# Patient Record
Sex: FEMALE | Race: WHITE | Employment: OTHER | URBAN - METROPOLITAN AREA
[De-identification: names, ages, dates, MRNs, and addresses within clinical notes are randomized per-mention and may not be internally consistent; named-entity substitution may affect disease eponyms.]

---

## 2017-05-01 ENCOUNTER — HOSPITAL ENCOUNTER (OUTPATIENT)
Dept: PHYSICAL THERAPY | Age: 67
Discharge: HOME | End: 2017-05-01
Payer: MEDICARE

## 2017-05-01 PROCEDURE — 97530 THERAPEUTIC ACTIVITIES: CPT | Performed by: PHYSICAL THERAPIST

## 2017-05-01 PROCEDURE — G8979 MOBILITY GOAL STATUS: HCPCS | Performed by: PHYSICAL THERAPIST

## 2017-05-01 PROCEDURE — G8978 MOBILITY CURRENT STATUS: HCPCS | Performed by: PHYSICAL THERAPIST

## 2017-05-01 PROCEDURE — 97162 PT EVAL MOD COMPLEX 30 MIN: CPT | Performed by: PHYSICAL THERAPIST

## 2017-05-01 NOTE — PROGRESS NOTES
[address] PHYS[facility] THERAPY AT [facility]  [address] [place] [address], Eulalio Carbone  Phone [phone]  Fax [phone] / [address] [address] PHYSICAL THERAPY SERVICES  Patient Name: Alexis Clark : 1950   Medical   Diagnosis: Pain in right foot [M79.671] Treatment Diagnosis: R foot pain   Onset Date: 2016     Referral Source: Markus Gonzales DPM Start of Care [facility]): 2017   Prior Hospitalization: See medical history Provider #: [id number]   Prior Level of Function: Functional IND, IND ambulator   Comorbidities: Fibromyalgia, depression, arthritis, HBP   Medications: Verified on Patient Summary List   The Plan of Care and following information is based on the information from the initial evaluation.   ===========================================================================================  Assessment / key information:  Pt is a 78year old female with subjective complaints of R heel pain that started in 2016 for unknown reasons. She state that she went to see her MD in 2016 where she had an X-ray and was placed in a boot for 6 weeks. X-rays of the R heel was negative. She states that after the 6 weeks she went back to the MD and her pain was better but did not completely go away. She states that she was given heel pads for her shoes which has helped. She had an MRI when she went back to see her MD which showed chronic R [place]'s tendinitis and she was referred to PT. Currently she rates her pain level in the R heel is a 3/10. Aggravating factors include pain at night, prolonged walking, and going up/down steps. Her symptoms are alleviated with heat/ice, rest, and prescription medication. Pt reports that she has a 2 story home and her bedroom is on the second floor. IND with all ALDs/IADLS. Pt is retired. Negative for red flags. FOTO: 47/100. Upon evaluation, pt ambulates with normalized gait pattern.  Noted B forefoot supination with rigid forefoot. Noted calcaneal varus B. SLS B: L: 11 sec, R: 14 sec. SL HR: L 25, R: 2 with increased pain 8/10 in the R AT. AROM of the R ankle: DF: 7 deg PF: 55 deg IN: 35 deg EV: 15 deg, L ankle: DF: 10 deg, PF: 50 deg, IN: 35 deg EV: 10 deg. B hip strength: flexion: 5/5 B abduction: 4/5, extension: 4+/5. B ankle strength is a 5/5 except R ankle PF 3/5. Noted tenderness over the R Onel's tendon. Negative special tests of the R ankle/foot. Pt would benefit from a course of skilled PT to address above deficits to improve overall functional activity tolerance.   ==================================================================================Eval Complexity: History: HIGH Complexity :3+ comorbidities / personal factors will impact the outcome/ POC Exam:HIGH Complexity : 4+ Standardized tests and measures addressing body structure, function, activity limitation and / or participation in recreation  Presentation: MEDIUM Complexity : Evolving with changing characteristics  Clinical Decision Making:MEDIUM Complexity : FOTO score of 26-74Overall Complexity:MEDIUM  Problem List: pain affecting function, decrease ROM, decrease strength, impaired gait/ balance, decrease ADL/ functional abilitiies, decrease activity tolerance, decrease flexibility/ joint mobility and decrease transfer abilities   Treatment Plan may include any combination of the following: Therapeutic exercise, Therapeutic activities, Neuromuscular re-education, Physical agent/modality, Gait/balance training, Manual therapy and Patient education  Patient / Family readiness to learn indicated by: asking questions and trying to perform skills  Persons(s) to be included in education: patient (P)  Barriers to Learning/Limitations: None  Measures taken:    Patient Goal (s): Re-strengthening and rebuild ankle   Patient self reported health status: fair  Rehabilitation Potential: good   Short Term Goals:  To be accomplished in  2 weeks: 1. Pt will be IND and compliant with HEP for self-management symptoms.  Long Term Goals: To be accomplished in  10  treatments:  1. Pt will improve B hip strength to 5/5 to gait stability. 2. Pt will be able to ambulate on unlevel surfaces with no more than 2/10 pain to perform yard-work without restrictions. 3. Pt will improve R PF strength to 4+/5 to improve stair negotiation. 4. Pt will improve FOTO score to at least 56/100 as a functional indicator of improved mobility. Frequency / Duration:   Patient to be seen  2  times per week for 10  treatments:  Patient / Caregiver education and instruction: self care  G-Codes (GP): Mobility: [id number] Current  CK= 40-59%   Goal  CK= 40-59%. The severity rating is based on the FOTO Score    Therapist Signature: Suzy Green DPT Date: 2/6/4964   Certification Period: 5/1/17-7/30/17 Time: 11:25 AM   ===========================================================================================  I certify that the above Physical Therapy Services are being furnished while the patient is under my care. I agree with the treatment plan and certify that this therapy is necessary. Physician Signature:        Date:       Time:     Please sign and return to [facility] or you may fax the signed copy to [phone]. Thank you.

## 2017-05-01 NOTE — PROGRESS NOTES
PT DAILY TREATMENT NOTE - [facility] 3-16    Patient Name: Rodri Hernandez   Date:2017  : 1950  [x]  Patient  Verified  Payor: Marques  / Plan: [place] MED[place]ARE PART B / Product Type: Medicare /    In time:1046  Out time:1120  Total Treatment Time (min): 34  Total Timed Codes (min): 10  1:1 Treatment Time ( [address] only):  34  Visit #: 1 of 10    Treatment Area: Pain in right foot [M79.671]    SUBJECTIVE  Pain Level (0-10 scale): 3/10  Any medication changes, allergies to medications, adverse drug reactions, diagnosis change, or new procedure performed?: [x] No    [] Yes (see summary sheet for update)  Subjective functional status/changes:   [] No changes reported  Pt is a 78year old female with subjective complaints of R heel pain that started in 2016 for unknown reasons. She state that she went to see her MD in 2016 where she had an X-ray and was placed in a boot for 6 weeks. X-rays of the R heel was negative. She states that after the 6 weeks she went back to the MD and her pain was better but did not completely go away. She states that she was given heel pads for her shoes which has helped. She had an MRI when she went back to see her MD which showed chronic R Onel's tendinitis and she was referred to PT. Currently she rates her pain level in the R heel is a 3/10. Aggravating factors include pain at night, prolonged walking, and going up/down steps. Her symptoms are alleviated with heat/ice, rest, and prescription medication. Pt reports that she has a 2 story home and her bedroom is on the second floor. IND with all ALDs/IADLS. Pt is retired. Negative for red flags. FOTO: 47/100.      OBJECTIVE    24 min [x]Eval                  []Re-Eval          With   [] TE   [x] TA 10 min   [] neuro   [] other: Patient Education: [x] Review HEP    [] Progressed/Changed HEP based on:   [] positioning   [] body mechanics   [] transfers   [] heat/ice application    [x] other: proper shoes for foot mechanics, importance of wearing proper supportive shoes      Other Objective/Functional Measures:   Upon evaluation, pt ambulates with normalized gait pattern. Noted B forefoot supination with rigid forefoot. Noted calcaneal varus B. SLS B: L: 11 sec, R: 14 sec. SL HR: L 25, R: 2 with increased pain 8/10 in the R AT. AROM of the R ankle: DF: 7 deg PF: 55 deg IN: 35 deg EV: 15 deg, L ankle: DF: 10 deg, PF: 50 deg, IN: 35 deg EV: 10 deg. B hip strength: flexion: 5/5 B abduction: 4/5, extension: 4+/5. B ankle strength is a 5/5 except R ankle PF 3/5. Noted tenderness over the R Onel's tendon. Negative special tests of the R ankle/foot.      Pain Level (0-10 scale) post treatment: 2-3/10    ASSESSMENT/Changes in Function:  [x]  See Plan of Care  []  See progress note/recertification  []  See Discharge Summary         Progress towards goals / Updated goals:  PER POC    PLAN  []  Upgrade activities as tolerated     [x]  Continue plan of care  []  Update interventions per flow sheet       []  Discharge due to:_  [x]  Other:2x/week for 10 sessions       Justification for Eval Code Complexity:  Patient History : depression, chronicity, fibromyalgia, -high  Examination see exam high  Clinical Presentation: evolving-medium  Clinical Decision Making : FOTO : 47 /100 medium      Martin Priest DPT 5/1/2017  11:25 AM

## 2017-05-05 ENCOUNTER — HOSPITAL ENCOUNTER (OUTPATIENT)
Dept: PHYSICAL THERAPY | Age: 67
Discharge: HOME | End: 2017-05-05
Payer: MEDICARE

## 2017-05-05 PROCEDURE — 97110 THERAPEUTIC EXERCISES: CPT | Performed by: PHYSICAL THERAPIST

## 2017-05-05 PROCEDURE — 97140 MANUAL THERAPY 1/> REGIONS: CPT | Performed by: PHYSICAL THERAPIST

## 2017-05-05 NOTE — PROGRESS NOTES
PT DAILY TREATMENT NOTE     Patient Name: Charisma Ríos  Date:2017  : 1950  [x]  Patient  Verified  Payor: Louie Juliana / Plan: [place] MEDICARE PART B / Product Type: Medicare /    In time:235  Out time:335  Total Treatment Time (min): 60  Total Timed Codes (min): 50  1:1 Treatment Time ( [address] only): 40  Visit #: 2 of 10    Treatment Area: Pain in right foot [M79.969]    SUBJECTIVE  Pain Level (0-10 scale): 0/10  Any medication changes, allergies to medications, adverse drug reactions, diagnosis change, or new procedure performed?: [x] No    [] Yes (see summary sheet for update)  Subjective functional status/changes:   [] No changes reported  \"I have no pain in the heel but my back hurts\"    OBJECTIVE    Modality rationale: decrease pain and increase tissue extensibility to improve the patients ability to improve functional activity tolerance   Min Type Additional Details    [] Estim:  []Unatt       []IFC  []Premod                        []Other:  []w/ice   []w/heat  Position:  Location:    [] Estim: []Att    []TENS instruct  []NMES                    []Other:  []w/US   []w/ice   []w/heat  Position:  Location:    []  Traction: [] Cervical       []Lumbar                       [] Prone          []Supine                       []Intermittent   []Continuous Lbs:  [] before manual  [] after manual    []  Ultrasound: []Continuous   [] Pulsed                           []1MHz   []3MHz W/cm2:  Location:    []  Iontophoresis with dexamethasone         Location: [] Take home patch   [] In clinic   10 []  Ice     [x]  heat  []  Ice massage  []  Laser   []  Anodyne Position: prone  Location: R Gastroc    []  Laser with stim  []  Other:  Position:  Location:    []  Vasopneumatic Device Pressure:       [] lo [] med [] hi   Temperature: [] lo [] med [] hi   [] Skin assessment post-treatment:  []intact []redness- no adverse reaction    []redness  adverse reaction:     40 min Therapeutic Exercise:  [x] See flow sheet : initiated PT POC   Rationale: increase ROM, increase strength, improve balance and increase proprioception to improve the patients ability to improve functional mobility    10 min Manual Therapy:  DTM to the R Gastroc/Soleus   Rationale: decrease pain, increase ROM, increase tissue extensibility and decrease trigger points to improve standing tolerance    With   [] TE   [] TA   [] neuro   [] other: Patient Education: [x] Review HEP    [] Progressed/Changed HEP based on:   [] positioning   [] body mechanics   [] transfers   [] heat/ice application    [] other:      Other Objective/Functional Measures:   Noted TrPs in the R lateral Gastroc       Pain Level (0-10 scale) post treatment: 2/10    ASSESSMENT/Changes in Function:   Pt tolerated all therex with mild increase in pain after manual therapy. Continue to progress as tolerated to decrease Gastroc. Patient will continue to benefit from skilled PT services to modify and progress therapeutic interventions, address functional mobility deficits, address ROM deficits, address strength deficits, analyze and address soft tissue restrictions and address imbalance/dizziness to attain remaining goals.      []  See Plan of Care  []  See progress note/recertification  []  See Discharge Summary         Progress towards goals / Updated goals:  1st FU visit, initiated PT POC    PLAN  []  Upgrade activities as tolerated     [x]  Continue plan of care  []  Update interventions per flow sheet       []  Discharge due to:_  []  Other:_      Meche Agudelo DPT 5/5/2017  3:43PM    Future Appointments  Date Time Provider [address]   5/8/2017 12:30 PM Meche Agudelo DPT [facility]   5/12/2017 9:30 AM [facility], PTA [facility]   5/15/2017 4:30 PM [facility], PTA [facility]   5/19/2017 10:30 AM [facility], PTA [facility]   5/22/2017 10:00 AM [facility], PTA [facility]   5/26/2017 10:00 AM Meche Agudelo DPT [facility]

## 2017-05-08 ENCOUNTER — HOSPITAL ENCOUNTER (OUTPATIENT)
Dept: PHYSICAL THERAPY | Age: 67
Discharge: HOME | End: 2017-05-08
Payer: MEDICARE

## 2017-05-08 PROCEDURE — 97016 VASOPNEUMATIC DEVICE THERAPY: CPT | Performed by: PHYSICAL THERAPIST

## 2017-05-08 PROCEDURE — 97140 MANUAL THERAPY 1/> REGIONS: CPT | Performed by: PHYSICAL THERAPIST

## 2017-05-08 NOTE — PROGRESS NOTES
PT DAILY TREATMENT NOTE     Patient Name: Kojo Harrell  Date:2017  : 1950  [x]  Patient  Verified  Payor: Ameya Urena / Plan: [place] MEDICARE PART B / Product Type: Medicare /    In time:1230  Out time:132  Total Treatment Time (min): 62  Total Timed Codes (min): 52  1:1 Treatment Time ( [address] only): 10  Visit #: 3 of 10    Treatment Area: Pain in right foot [M79.834]    SUBJECTIVE  Pain Level (0-10 scale): 2/10  Any medication changes, allergies to medications, adverse drug reactions, diagnosis change, or new procedure performed?: [x] No    [] Yes (see summary sheet for update)  Subjective functional status/changes:   [] No changes reported  \"I feel like I can walk more normally with decreased pain\"    OBJECTIVE    Modality rationale: decrease pain and increase tissue extensibility to improve the patients ability to improve functional activity tolerance   Min Type Additional Details    [] Estim:  []Unatt       []IFC  []Premod                        []Other:  []w/ice   []w/heat  Position:  Location:    [] Estim: []Att    []TENS instruct  []NMES                    []Other:  []w/US   []w/ice   []w/heat  Position:  Location:    []  Traction: [] Cervical       []Lumbar                       [] Prone          []Supine                       []Intermittent   []Continuous Lbs:  [] before manual  [] after manual    []  Ultrasound: []Continuous   [] Pulsed                           []1MHz   []3MHz W/cm2:  Location:    []  Iontophoresis with dexamethasone         Location: [] Take home patch   [] In clinic    []  Ice     []  heat  []  Ice massage  []  Laser   []  Anodyne Position:   Location:     []  Laser with stim  []  Other:  Position:  Location:   10 [x]  Vasopneumatic Device Pressure:       [] lo [x] med [] hi   Temperature: [x] lo [] med [] hi   [] Skin assessment post-treatment:  []intact []redness- no adverse reaction    []redness  adverse reaction:     42 min Therapeutic Exercise:  [x] See flow sheet :    Rationale: increase ROM, increase strength, improve balance and increase proprioception to improve the patients ability to improve functional mobility    10 min Manual Therapy:  DTM to the R AT/XFM   Rationale: decrease pain, increase ROM, increase tissue extensibility and decrease trigger points to improve standing tolerance    With   [] TE   [] TA   [] neuro   [] other: Patient Education: [x] Review HEP    [] Progressed/Changed HEP based on:   [] positioning   [] body mechanics   [] transfers   [] heat/ice application    [] other:      Other Objective/Functional Measures:   Tenderness noted in the R AT with decreased pain       Pain Level (0-10 scale) post treatment: 0/10    ASSESSMENT/Changes in Function:   Pt presents with improvements in activity tolerance following today's therapy session with improvements in overall gait pattern. Continue to progress as tolerated per current POC. Patient will continue to benefit from skilled PT services to modify and progress therapeutic interventions, address functional mobility deficits, address ROM deficits, address strength deficits, analyze and address soft tissue restrictions and address imbalance/dizziness to attain remaining goals. Progress towards goals / Updated goals: · Short Term Goals: To be accomplished in 2 weeks:  1. Pt will be IND and compliant with HEP for self-management symptoms. · Long Term Goals: To be accomplished in 10 treatments:  1. Pt will improve B hip strength to 5/5 to gait stability. 2. Pt will be able to ambulate on unlevel surfaces with no more than 2/10 pain to perform yard-work without restrictions. 3. Pt will improve R PF strength to 4+/5 to improve stair negotiation. 4. Pt will improve FOTO score to at least 56/100 as a functional indicator of improved mobility.      PLAN  [x]  Upgrade activities as tolerated     [x]  Continue plan of care  []  Update interventions per flow sheet       []  Discharge due to:_  []  Other:_ Caitlin Childs DPT 5/8/2017 2:53 PM    Future Appointments  Date Time Provider [address]   5/12/2017 9:30 AM Diana Bello, PTA [facility]   5/15/2017 4:30 PM Diana Bello, PTA [facility]   5/19/2017 10:30 AM Diana Bello, PTA [facility]   5/22/2017 10:00 AM Diana Bello PTA [facility]   5/26/2017 10:00 AM Caitlin Childs DPT [facility]

## 2017-05-12 ENCOUNTER — HOSPITAL ENCOUNTER (OUTPATIENT)
Dept: PHYSICAL THERAPY | Age: 67
Discharge: HOME | End: 2017-05-12
Payer: MEDICARE

## 2017-05-12 PROCEDURE — 97140 MANUAL THERAPY 1/> REGIONS: CPT

## 2017-05-12 PROCEDURE — 97110 THERAPEUTIC EXERCISES: CPT

## 2017-05-12 PROCEDURE — 97016 VASOPNEUMATIC DEVICE THERAPY: CPT

## 2017-05-12 NOTE — PROGRESS NOTES
PT DAILY TREATMENT NOTE     Patient Name: Aime Aguirre  Date:2017  : 1950  [x]  Patient  Verified  Payor: Sonia Sandoval / Plan: [place] MEDICARE PART B / Product Type: Medicare /    In time:  9:36 Out time:10:33  Total Treatment Time (min): 57  Total Timed Codes (min): 42  1:1 Treatment Time ( only): 30  Visit #: 4 of 10    Treatment Area: Pain in right foot [M79.872]    SUBJECTIVE  Pain Level (0-10 scale): 1/10  Any medication changes, allergies to medications, adverse drug reactions, diagnosis change, or new procedure performed?: [x] No    [] Yes (see summary sheet for update)  Subjective functional status/changes:   [] No changes reported  I feel like I can get more mileage  With walking when it comes to my heels     OBJECTIVE    Modality rationale: decrease pain and increase tissue extensibility to improve the patients ability to improve functional activity tolerance   Min Type Additional Details    [] Estim:  []Unatt       []IFC  []Premod                        []Other:  []w/ice   []w/heat  Position:  Location:    [] Estim: []Att    []TENS instruct  []NMES                    []Other:  []w/US   []w/ice   []w/heat  Position:  Location:    []  Traction: [] Cervical       []Lumbar                       [] Prone          []Supine                       []Intermittent   []Continuous Lbs:  [] before manual  [] after manual    []  Ultrasound: []Continuous   [] Pulsed                           []1MHz   []3MHz W/cm2:  Location:    []  Iontophoresis with dexamethasone         Location: [] Take home patch   [] In clinic    []  Ice     []  heat  []  Ice massage  []  Laser   []  Anodyne Position:   Location:     []  Laser with stim  []  Other:  Position:  Location:   15 [x]  Vasopneumatic Device Pressure:       [] lo [x] med [] hi   Temperature: [x] lo [] med [] hi   [] Skin assessment post-treatment:  []intact []redness- no adverse reaction    []redness  adverse reaction:     42 min Therapeutic Exercise: [x] See flow sheet :    Rationale: increase ROM, increase strength, improve balance and increase proprioception to improve the patients ability to improve functional mobility    15 min Manual Therapy:  DTM to the R AT/XFM   Rationale: decrease pain, increase ROM, increase tissue extensibility and decrease trigger points to improve standing tolerance    With   [] TE   [] TA   [] neuro   [] other: Patient Education: [x] Review HEP    [] Progressed/Changed HEP based on:   [] positioning   [] body mechanics   [] transfers   [] heat/ice application    [] other:      Other Objective/Functional Measures:   Pin specific tenderness to both sides of the Achilles Tendon        Pain Level (0-10 scale) post treatment: 0/10    ASSESSMENT/Changes in Function:    Patient will continue to benefit from skilled PT services to modify and progress therapeutic interventions, address functional mobility deficits, address ROM deficits, address strength deficits, analyze and address soft tissue restrictions and address imbalance/dizziness to attain remaining goals. Progress towards goals / Updated goals: · Short Term Goals: To be accomplished in 2 weeks:  1. Pt will be IND and compliant with HEP for self-management symptoms. · Long Term Goals: To be accomplished in 10 treatments:  1. Pt will improve B hip strength to 5/5 to gait stability. 2. Pt will be able to ambulate on unlevel surfaces with no more than 2/10 pain to perform yard-work without restrictions. 3. Pt will improve R PF strength to 4+/5 to improve stair negotiation. 4. Pt will improve FOTO score to at least 56/100 as a functional indicator of improved mobility.      PLAN  [x]  Upgrade activities as tolerated     [x]  Continue plan of care  []  Update interventions per flow sheet       []  Discharge due to:_  []  Other:_      Dominick Ly PTA 5/12/2017 2:53 PM    Future Appointments  Date Time Provider [address]   5/15/2017 4:30 PM Dominick Ly PTA [facility]   5/19/2017 10:30 AM Radha Gentile, PTA [facility]   5/22/2017 10:00 AM Radha Gentile, PTA [facility]   5/26/2017 10:00 AM Abigail Tiwari, TRESAT [facility]

## 2017-05-15 ENCOUNTER — HOSPITAL ENCOUNTER (OUTPATIENT)
Dept: PHYSICAL THERAPY | Age: 67
Discharge: HOME | End: 2017-05-15
Payer: MEDICARE

## 2017-05-15 PROCEDURE — 97110 THERAPEUTIC EXERCISES: CPT

## 2017-05-15 PROCEDURE — 97035 APP MDLTY 1+ULTRASOUND EA 15: CPT

## 2017-05-15 NOTE — PROGRESS NOTES
PT DAILY TREATMENT NOTE     Patient Name: Priti Cabrera  Date:5/15/2017  : 1950  [x]  Patient  Verified  Payor: [employer] / Plan: [place] MED[place] B / Product Type: Medicare /    In time: 4:30  Out time:5:25  Total Treatment Time (min): 55  Total Timed Codes (min): 45  1:1 Treatment Time ([facility] only): 23  Visit #: 5 of 10    Treatment Area: Pain in right foot [M79.671]    SUBJECTIVE  Pain Level (0-10 scale): 2-3/10  Any medication changes, allergies to medications, adverse drug reactions, diagnosis change, or new procedure performed?: [x] No    [] Yes (see summary sheet for update)  Subjective functional status/changes:   [] No changes reported  I had a bad weekend with my ankle and back I think because of my fibromyalgia     OBJECTIVE    Modality rationale: decrease pain and increase tissue extensibility to improve the patients ability to improve functional activity tolerance   Min Type Additional Details    [] Estim:  []Unatt       []IFC  []Premod                        []Other:  []w/ice   []w/heat  Position:  Location:    [] Estim: []Att    []TENS instruct  []NMES                    []Other:  []w/US   []w/ice   []w/heat  Position:  Location:    []  Traction: [] Cervical       []Lumbar                       [] Prone          []Supine                       []Intermittent   []Continuous Lbs:  [] before manual  [] after manual   8 [x]  Ultrasound: []Continuous   [x] Pulsed                           [x]1MHz   []3MHz W/cm2: 1.2 - 50%  Location: both sides of the Achilles Tendon     []  Iontophoresis with dexamethasone         Location: [] Take home patch   [] In clinic   10 []  Ice     [x]  heat  []  Ice massage  []  Laser   []  Anodyne Position: in prone  Location: to AT    []  Laser with stim  []  Other:  Position:  Location:    []  Vasopneumatic Device Pressure:       [] lo [x] med [] hi   Temperature: [x] lo [] med [] hi   [] Skin assessment post-treatment:  []intact []redness- no adverse reaction    []redness  adverse reaction:     37/32 min Therapeutic Exercise:  [x] See flow sheet : 5 min NC for warm up   Rationale: increase ROM, increase strength, improve balance and increase proprioception to improve the patients ability to improve functional mobility     min Manual Therapy:  held   Rationale: decrease pain, increase ROM, increase tissue extensibility and decrease trigger points to improve standing tolerance    With   [] TE   [] TA   [] neuro   [] other: Patient Education: [x] Review HEP    [] Progressed/Changed HEP based on:   [] positioning   [] body mechanics   [] transfers   [] heat/ice application    [] other:      Other Objective/Functional Measures:  Secondary to increase pain after last session-performed pulsed ultrasound to achilles tendon to assist with soreness to area        STG#1       Pain Level (0-10 scale) post treatment: 2-3/10    ASSESSMENT/Changes in Function:     Patient will continue to benefit from skilled PT services to modify and progress therapeutic interventions, address functional mobility deficits, address ROM deficits, address strength deficits, analyze and address soft tissue restrictions and address imbalance/dizziness to attain remaining goals. Progress towards goals / Updated goals: · Short Term Goals: To be accomplished in 2 weeks:  · 1. Pt will be IND and compliant with HEP for self-management symptoms. MET 5/15/17  · Long Term Goals: To be accomplished in 10 treatments:  1. Pt will improve B hip strength to 5/5 to gait stability. 2. Pt will be able to ambulate on unlevel surfaces with no more than 2/10 pain to perform yard-work without restrictions. 3. Pt will improve R PF strength to 4+/5 to improve stair negotiation. 4. Pt will improve FOTO score to at least 56/100 as a functional indicator of improved mobility.      PLAN  [x]  Upgrade activities as tolerated     [x]  Continue plan of care  []  Update interventions per flow sheet       []  Discharge due to:_  []  Other:_      Celsa Quinones PTA 5/15/2017 2:53 PM    Future Appointments  Date Time Provider [address]   5/15/2017 4:30 PM Martita Guevara [facility]   5/19/2017 10:30 AM Celsa Quinones PTA [facility]   5/22/2017 10:00 AM Celsa Quinones PTA [facility]   5/26/2017 10:00 AM Darien Ricardo DPT [facility]

## 2017-05-19 ENCOUNTER — HOSPITAL ENCOUNTER (OUTPATIENT)
Dept: PHYSICAL THERAPY | Age: 67
Discharge: HOME | End: 2017-05-19
Payer: MEDICARE

## 2017-05-19 PROCEDURE — 97140 MANUAL THERAPY 1/> REGIONS: CPT

## 2017-05-19 PROCEDURE — 97110 THERAPEUTIC EXERCISES: CPT

## 2017-05-19 NOTE — PROGRESS NOTES
PT DAILY TREATMENT NOTE     Patient Name: Candie Sorenson  Date:2017  : 1950  [x]  Patient  Verified  Payor: Ruiz Horton / Plan: [place] MEDICARE PART B / Product Type: Medicare /    In time: 10:34  Out time:11:41  Total Treatment Time (min):67  Total Timed Codes (min): 52  1:1 Treatment Time ( [address] only): 30  Visit #: 6 of 10    Treatment Area: Pain in right foot [M79.671]    SUBJECTIVE  Pain Level (0-10 scale): 1/10  Any medication changes, allergies to medications, adverse drug reactions, diagnosis change, or new procedure performed?: [x] No    [] Yes (see summary sheet for update)  Subjective functional status/changes:   [] No changes reported  I would say overall my ankle and leg feel better since starting therapy     OBJECTIVE    Modality rationale: decrease pain and increase tissue extensibility to improve the patients ability to improve functional activity tolerance   Min Type Additional Details    [] Estim:  []Unatt       []IFC  []Premod                        []Other:  []w/ice   []w/heat  Position:  Location:    [] Estim: []Att    []TENS instruct  []NMES                    []Other:  []w/US   []w/ice   []w/heat  Position:  Location:    []  Traction: [] Cervical       []Lumbar                       [] Prone          []Supine                       []Intermittent   []Continuous Lbs:  [] before manual  [] after manual    []  Ultrasound: []Continuous   [x] Pulsed                           [x]1MHz   []3MHz W/cm2: 1.2 - 50%  Location: both sides of the Achilles Tendon     []  Iontophoresis with dexamethasone         Location: [] Take home patch   [] In clinic   15 []  Ice     [x]  heat  []  Ice massage  []  Laser   []  Anodyne Position: in prone  Location: to AT    []  Laser with stim  []  Other:  Position:  Location:    []  Vasopneumatic Device Pressure:       [] lo [x] med [] hi   Temperature: [x] lo [] med [] hi   [] Skin assessment post-treatment:  []intact []redness- no adverse reaction []redness  adverse reaction:     37/32 min Therapeutic Exercise:  [x] See flow sheet : 5 min NC for warm up   Rationale: increase ROM, increase strength, improve balance and increase proprioception to improve the patients ability to improve functional mobility    15 min Manual Therapy:  DTM, TPR to gastro/solues and CFM to Achilles Tendon    Rationale: decrease pain, increase ROM, increase tissue extensibility and decrease trigger points to improve standing tolerance    With   [] TE   [] TA   [] neuro   [] other: Patient Education: [x] Review HEP    [] Progressed/Changed HEP based on:   [] positioning   [] body mechanics   [] transfers   [] heat/ice application    [] other:      Other Objective/Functional Measures:  Progressing with LTG#2        Pain Level (0-10 scale) post treatment: 0/10    ASSESSMENT/Changes in Function:   Patient presents with trigger points to the lateral aspect of gastro/soleus - decrease pain and tenderness directly over the achilles tendon with decrease edema noted as well  Patient will continue to benefit from skilled PT services to modify and progress therapeutic interventions, address functional mobility deficits, address ROM deficits, address strength deficits, analyze and address soft tissue restrictions and address imbalance/dizziness to attain remaining goals. Progress towards goals / Updated goals: · Short Term Goals: To be accomplished in 2 weeks:  · 1. Pt will be IND and compliant with HEP for self-management symptoms. MET 5/15/17  · Long Term Goals: To be accomplished in 10 treatments:  1. Pt will improve B hip strength to 5/5 to gait stability. 2. Pt will be able to ambulate on unlevel surfaces with no more than 2/10 pain to perform yard-work without restrictions. PROGRESSING 5/19/17  3. Pt will improve R PF strength to 4+/5 to improve stair negotiation. 4. Pt will improve FOTO score to at least 56/100 as a functional indicator of improved mobility.      PLAN  [x]  Upgrade activities as tolerated     [x]  Continue plan of care  []  Update interventions per flow sheet       []  Discharge due to:_  []  Other:_      Francis Montano PTA 5/19/2017 2:53 PM    Future Appointments  Date Time Provider [address]   5/22/2017 10:00 AM Francis Montnao PTA [facility]   5/26/2017 10:00 AM Guillaume Santana DPT [facility]

## 2017-05-22 ENCOUNTER — HOSPITAL ENCOUNTER (OUTPATIENT)
Dept: PHYSICAL THERAPY | Age: 67
Discharge: HOME | End: 2017-05-22
Payer: MEDICARE

## 2017-05-22 PROCEDURE — 97140 MANUAL THERAPY 1/> REGIONS: CPT

## 2017-05-22 PROCEDURE — 97110 THERAPEUTIC EXERCISES: CPT

## 2017-05-22 NOTE — PROGRESS NOTES
PT DAILY TREATMENT NOTE     Patient Name: Lurdes Schmitz  Date:2017  : 1950  [x]  Patient  Verified  Payor: Chana Postin / Plan: [place] MEDICARE PART B / Product Type: Medicare /    In time: 10:05  Out time:11:00  Total Treatment Time (min):55  Total Timed Codes (min): 45  1:1 Treatment Time ( [address] only): 38  Visit #: 7 of 10    Treatment Area: Pain in right foot [M79.841]    SUBJECTIVE  Pain Level (0-10 scale): ache/10  Any medication changes, allergies to medications, adverse drug reactions, diagnosis change, or new procedure performed?: [x] No    [] Yes (see summary sheet for update)  Subjective functional status/changes:   [] No changes reported  It is more in my hips today than my ankle     OBJECTIVE    Modality rationale: decrease pain and increase tissue extensibility to improve the patients ability to improve functional activity tolerance   Min Type Additional Details    [] Estim:  []Unatt       []IFC  []Premod                        []Other:  []w/ice   []w/heat  Position:  Location:    [] Estim: []Att    []TENS instruct  []NMES                    []Other:  []w/US   []w/ice   []w/heat  Position:  Location:    []  Traction: [] Cervical       []Lumbar                       [] Prone          []Supine                       []Intermittent   []Continuous Lbs:  [] before manual  [] after manual    []  Ultrasound: []Continuous   [x] Pulsed                           [x]1MHz   []3MHz W/cm2: 1.2 - 50%  Location: both sides of the Achilles Tendon     []  Iontophoresis with dexamethasone         Location: [] Take home patch   [] In clinic   10 []  Ice     [x]  heat  []  Ice massage  []  Laser   []  Anodyne Position: in prone  Location: to AT    []  Laser with stim  []  Other:  Position:  Location:    []  Vasopneumatic Device Pressure:       [] lo [x] med [] hi   Temperature: [x] lo [] med [] hi   [] Skin assessment post-treatment:  []intact []redness- no adverse reaction    []redness  adverse reaction: 30/25 min Therapeutic Exercise:  [x] See flow sheet : 5 min NC for warm up   Rationale: increase ROM, increase strength, improve balance and increase proprioception to improve the patients ability to improve functional mobility    15 min Manual Therapy:  DTM, TPR to gastro/solues and gentle joint mobs   Rationale: decrease pain, increase ROM, increase tissue extensibility and decrease trigger points to improve standing tolerance    With   [] TE   [] TA   [] neuro   [] other: Patient Education: [x] Review HEP    [] Progressed/Changed HEP based on:   [] positioning   [] body mechanics   [] transfers   [] heat/ice application    [] other:      Other Objective/Functional Measures:  Performed joint mobs to ankle to decrease stiffness       Pain Level (0-10 scale) post treatment: 0/10    ASSESSMENT/Changes in Function:   Patient  Continues to presents with trigger points to the lateral aspect of gastro/soleus - some stiffness felt in the ankle joint with mobs   Patient will continue to benefit from skilled PT services to modify and progress therapeutic interventions, address functional mobility deficits, address ROM deficits, address strength deficits, analyze and address soft tissue restrictions and address imbalance/dizziness to attain remaining goals. Progress towards goals / Updated goals: · Short Term Goals: To be accomplished in 2 weeks:  · 1. Pt will be IND and compliant with HEP for self-management symptoms. MET 5/15/17  · Long Term Goals: To be accomplished in 10 treatments:  1. Pt will improve B hip strength to 5/5 to gait stability. 2. Pt will be able to ambulate on unlevel surfaces with no more than 2/10 pain to perform yard-work without restrictions. PROGRESSING 5/19/17  3. Pt will improve R PF strength to 4+/5 to improve stair negotiation. 4. Pt will improve FOTO score to at least 56/100 as a functional indicator of improved mobility.      PLAN  [x]  Upgrade activities as tolerated     [x] Continue plan of care  []  Update interventions per flow sheet       []  Discharge due to:_  []  Other:_      Fartun Rees, BRYAN 5/22/2017 2:53 PM    Future Appointments  Date Time Provider [address]   5/26/2017 10:00 AM Abril Alaniz DPT [facility]

## 2017-05-26 ENCOUNTER — HOSPITAL ENCOUNTER (OUTPATIENT)
Dept: PHYSICAL THERAPY | Age: 67
Discharge: HOME | End: 2017-05-26
Payer: MEDICARE

## 2017-05-26 PROCEDURE — 97140 MANUAL THERAPY 1/> REGIONS: CPT | Performed by: PHYSICAL THERAPIST

## 2017-05-26 NOTE — PROGRESS NOTES
PT DAILY TREATMENT NOTE 12    Patient Name: Van Flores  Date:2017  : 1950  [x]  Patient  Verified  Payor: Jade Lam / Plan: [place] MEDICARE PART B / Product Type: Medicare /    In time: 1000  Out time:1100  Total Treatment Time (min)60  Total Timed Codes (min): 50  1:1 Treatment Time ( [address] only): 15  Visit #: 8 of 10    Treatment Area: Pain in right foot [M79.501]    SUBJECTIVE  Pain Level (0-10 scale): ache  Any medication changes, allergies to medications, adverse drug reactions, diagnosis change, or new procedure performed?: [x] No    [] Yes (see summary sheet for update)  Subjective functional status/changes:   [] No changes reported  My ankle has been doing really well.      OBJECTIVE    Modality rationale: decrease pain and increase tissue extensibility to improve the patients ability to improve functional activity tolerance   Min Type Additional Details    [] Estim:  []Unatt       []IFC  []Premod                        []Other:  []w/ice   []w/heat  Position:  Location:    [] Estim: []Att    []TENS instruct  []NMES                    []Other:  []w/US   []w/ice   []w/heat  Position:  Location:    []  Traction: [] Cervical       []Lumbar                       [] Prone          []Supine                       []Intermittent   []Continuous Lbs:  [] before manual  [] after manual    []  Ultrasound: []Continuous   [x] Pulsed                           [x]1MHz   []3MHz W/cm2: 1.2 - 50%  Location: both sides of the Achilles Tendon     []  Iontophoresis with dexamethasone         Location: [] Take home patch   [] In clinic   10 []  Ice     [x]  heat  []  Ice massage  []  Laser   []  Anodyne Position: in prone  Location: to AT    []  Laser with stim  []  Other:  Position:  Location:    []  Vasopneumatic Device Pressure:       [] lo [x] med [] hi   Temperature: [x] lo [] med [] hi   [] Skin assessment post-treatment:  []intact []redness- no adverse reaction    []redness  adverse reaction:     35 min Therapeutic Exercise:  [x] See flow sheet :    Rationale: increase ROM, increase strength, improve balance and increase proprioception to improve the patients ability to improve functional mobility    15 min Manual Therapy:  DTM, TPR to gastro/solues and gentle joint mobs   Rationale: decrease pain, increase ROM, increase tissue extensibility and decrease trigger points to improve standing tolerance    With   [] TE   [] TA   [] neuro   [] other: Patient Education: [x] Review HEP    [] Progressed/Changed HEP based on:   [] positioning   [] body mechanics   [] transfers   [] heat/ice application    [] other:      Other Objective/Functional Measures:    Noted R Gastroc/Soleus tightness        Pain Level (0-10 scale) post treatment: 0/10    ASSESSMENT/Changes in Function:   Pt continues to have tightness in the R Gastroc/Soleus. Reassess next session for DC. Patient will continue to benefit from skilled PT services to modify and progress therapeutic interventions, address functional mobility deficits, address ROM deficits, address strength deficits, analyze and address soft tissue restrictions and address imbalance/dizziness to attain remaining goals. Progress towards goals / Updated goals: · Short Term Goals: To be accomplished in 2 weeks:  · 1. Pt will be IND and compliant with HEP for self-management symptoms. MET 5/15/17  · Long Term Goals: To be accomplished in 10 treatments:  1. Pt will improve B hip strength to 5/5 to gait stability. 2. Pt will be able to ambulate on unlevel surfaces with no more than 2/10 pain to perform yard-work without restrictions. PROGRESSING 5/19/17  3. Pt will improve R PF strength to 4+/5 to improve stair negotiation. 4. Pt will improve FOTO score to at least 56/100 as a functional indicator of improved mobility.      PLAN  [x]  Upgrade activities as tolerated     [x]  Continue plan of care  []  Update interventions per flow sheet       []  Discharge due to:_  []  Other:_ Rachael Jones DPT 5/26/2017 1211  PM    Future Appointments  Date Time Provider [address]   6/1/2017 4:00 PM Nga Whitney PTA [facility]

## 2017-06-01 ENCOUNTER — HOSPITAL ENCOUNTER (OUTPATIENT)
Dept: PHYSICAL THERAPY | Age: 67
Discharge: HOME | End: 2017-06-01
Payer: MEDICARE

## 2017-06-01 PROCEDURE — G8979 MOBILITY GOAL STATUS: HCPCS | Performed by: PHYSICAL THERAPIST

## 2017-06-01 PROCEDURE — G8980 MOBILITY D/C STATUS: HCPCS | Performed by: PHYSICAL THERAPIST

## 2017-06-01 PROCEDURE — 97110 THERAPEUTIC EXERCISES: CPT | Performed by: PHYSICAL THERAPIST

## 2017-06-01 NOTE — PROGRESS NOTES
PT DAILY TREATMENT NOTE     Patient Name: Lana Mahan  Date:2017  : 1950  [x]  Patient  Verified  Payor: [address] [place] [address] / Plan: [place] MEDICARE PART B / Product Type: Medicare /    In time: 135  Out time:218  Total Treatment Time (min)43  Total Timed Codes (min): 43  1:1 Treatment Time ( [address] only): 43  Visit #: 9 of 10    Treatment Area: Pain in right foot [M79.584]    SUBJECTIVE  Pain Level (0-10 scale): ache  Any medication changes, allergies to medications, adverse drug reactions, diagnosis change, or new procedure performed?: [x] No    [] Yes (see summary sheet for update)  Subjective functional status/changes:   [] No changes reported  Pt reports 75% improvement of symptoms. Since therapy pt reports improvements in overall pain levels especially at night. She states that she is also able to walk more comfortable without pain on level/unlevel surfaces with just an achy feeling. Pt denies functional limitations. She reports she would be 25% better if her ache in the back of her heel would go away.      OBJECTIVE    Modality rationale: decrease pain and increase tissue extensibility to improve the patients ability to improve functional activity tolerance   Min Type Additional Details    [] Estim:  []Unatt       []IFC  []Premod                        []Other:  []w/ice   []w/heat  Position:  Location:    [] Estim: []Att    []TENS instruct  []NMES                    []Other:  []w/US   []w/ice   []w/heat  Position:  Location:    []  Traction: [] Cervical       []Lumbar                       [] Prone          []Supine                       []Intermittent   []Continuous Lbs:  [] before manual  [] after manual    []  Ultrasound: []Continuous   [x] Pulsed                           [x]1MHz   []3MHz W/cm2: 1.2 - 50%  Location: both sides of the Achilles Tendon     []  Iontophoresis with dexamethasone         Location: [] Take home patch   [] In clinic    []  Ice     []  heat  []  Ice massage  []  Laser   [] Anodyne Position:   Location:     []  Laser with stim  []  Other:  Position:  Location:    []  Vasopneumatic Device Pressure:       [] lo [x] med [] hi   Temperature: [x] lo [] med [] hi   [] Skin assessment post-treatment:  []intact []redness- no adverse reaction    []redness  adverse reaction:     43 min Therapeutic Exercise:  [x] See flow sheet : PT reassessment   Rationale: increase ROM, increase strength, improve balance and increase proprioception to improve the patients ability to improve functional mobility      With   [] TE   [] TA   [] neuro   [] other: Patient Education: [x] Review HEP    [] Progressed/Changed HEP based on:   [] positioning   [] body mechanics   [] transfers   [] heat/ice application    [] other:      Other Objective/Functional Measures:    B hip strength is a 5/5 grossly in all planes  FOTO Improved to 55/100 from 47/100  Pt was able to perform 10 SL HR   Updated HEP     Pain Level (0-10 scale) post treatment: 0/10    ASSESSMENT/Changes in Function:   Upon reassessment, pt presents with improvements in overall B hip strength and R PF strength leading to improvements in functional mobility. At this time pt is IND with home program and has met all goals in PT therefore, is ready to continue to maintain/maximize gains in PT with updated HEP. Progress towards goals / Updated goals: · Short Term Goals: To be accomplished in 2 weeks:  · 1. Pt will be IND and compliant with HEP for self-management symptoms. MET 5/15/17  · Long Term Goals: To be accomplished in 10 treatments:  1. Pt will improve B hip strength to 5/5 to gait stability. MET 6/1/17  2. Pt will be able to ambulate on unlevel surfaces with no more than 2/10 pain to perform yard-work without restrictions. MET 6/1/17  3. Pt will improve R PF strength to 4+/5 to improve stair negotiation. NOT MET 4/5 6/1/17  4. Pt will improve FOTO score to at least 56/100 as a functional indicator of improved mobility.   NOT MET 6/1/17    PLAN  []  Upgrade activities as tolerated     []  Continue plan of care  []  Update interventions per flow sheet       [x]  Discharge due to:MET, PROGRESSING TOWARDS LONG-TERM GOALS. DC TO UPDATED HEP TO MAINTAIN/MAXIMIZE GAINS IN PT  []  Other:_      Aisha Boswell DPT 6/1/2017 322 PM    No future appointments.

## 2017-06-01 NOTE — PROGRESS NOTES
[address] [facility]  [address] [place] [address],[address], Anita cunningham, Eulalio Sanchez  Phone [phone]  Fax  [facility]          Patient Name: Felisha Singer : 1950   Treatment/Medical Diagnosis: Pain in right foot [M79.671]   Onset Date: 2016    Referral Source: July Gibbons DPM Start of Care [facility], [place]):    Prior Hospitalization: See Medical History Provider #: [id number]   Prior Level of Function: Functional IND, IND ambulator   Comorbidities: Fibromyalgia, depression, arthritis, HBP   Medications: Verified on Patient Summary List   Visits from [facility]: 9 Missed Visits: 0       Goal/Measure of Progress Goal Met? Short Term Goals: To be accomplished in 2 weeks:  1. Pt will be IND and compliant with HEP for self-management symptoms. MET 5/15/17    Long Term Goals: To be accomplished in 10 treatments:  1. Pt will improve B hip strength to 5/5 to gait stability. MET 17  2. Pt will be able to ambulate on unlevel surfaces with no more than 2/10 pain to perform yard-work without restrictions. MET 17  3. Pt will improve R PF strength to 4+/5 to improve stair negotiation. NOT MET 4/5 17  4. Pt will improve FOTO score to at least 56/100 as a functional indicator of improved mobility. NOT MET 17    Key Functional Changes/Progress: Pt reports 75% improvement of symptoms. Since therapy pt reports improvements in overall pain levels especially at night. She states that she is also able to walk more comfortable without pain on level/unlevel surfaces with just an achy feeling. Pt denies functional limitations. She reports she would be 25% better if her ache in the back of her heel would go away. Upon reassessment, pt presents with improvements in overall B hip strength and R PF strength leading to improvements in functional mobility.  At this time pt is IND with home program and has met all goals in PT therefore, is ready to continue to maintain/maximize gains in PT with updated HEP. Other Objective/Functional Measures:   B hip strength is a 5/5 grossly in all planes  FOTO Improved to 55/100 from 47/100  Pt was able to perform 10 SL HR   Updated HEP     G-Codes (GP): Mobility:   Goal  CK= 40-59%  D/C  CK= 40-59%. The severity rating is based on the FOTO Score    Assessments/Recommendations: Discontinue therapy. Progressing towards or have reached established goals. If you have any questions/comments please contact us directly at [phone]. Thank you for allowing us to assist in the care of your patient.     Therapist Signature: Aisha Boswell DPT Date: 6/1/17   Reporting Period: 5/1/17-6/1/17 Time: 3:23 PM

## 2021-01-08 NOTE — PATIENT DISCUSSION
HYPERTENSION WITHOUT RETINOPATHY OU: REVIEWED REFERALL NOTES/FROM RODOLFO TOVAR. NO RETINOPATHY TODAY. CONTINUE GOOD BP CONTROL AND FOLLOW-UP AS DIRECTED WITH PCP.

## 2021-01-08 NOTE — PATIENT DISCUSSION
DERMATOCHALASIS / PTOSIS RUL AND ROSA M:  PATIENT NOT BOTHERED AND NOT VISUALLY SIGNIFICANT TO PATIENT. SCHEDULE WITH OCULOPLASTIC SPECIALIST IF PATIENT DESIRES.

## 2021-01-08 NOTE — PATIENT DISCUSSION
Surgery Counseling:  I have discussed the option of glasses versus cataract surgery versus following, It was explained that when vision no longer meets the patient's visual needs and a new prescription for glasses is not likely to improve the patient's visual symptoms, the option of cataract surgery is a reasonable next step. It was explained that there is no guarantee that removing the cataract will improve their visual symptoms; however, it is believed that the cataract is contributing to the patient's visual impairment and surgery may noticeably improve both the visual and functional status of the patient. 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

## 2021-01-08 NOTE — PATIENT DISCUSSION
CATARACT, OU - VISUALLY SIGNIFICANT. SURGERY INDICATED. DISCUSSED PROCEDURE AND OPTIONS IN DETAIL. PATIENT NOT A GOOD CANDIDATE FOR MF IOL, BUT POSSIBLE CANDIDATE FOR EDOF IOL. PATIENT DOES NOT MIND WEARING GLASSES FOR READING/INTERMEDIATE TASKS. SCHEDULE PHACO WITH IOL OD FIRST THEN OS IF VISUAL SYMPTOMS PERSIST AND ORDER IOL CALCULATION. GLASSES RX GIVEN TO FILL IF DESIRES IN THE EVENT PATIENT DOES NOT PROCEED WITH SURGERY.

## 2021-01-08 NOTE — PATIENT DISCUSSION
Pinguecula Counseling:  I have explained to the patient at length the diagnosis of pinguecula and its pathophysiology. I recommended the patient adequately protect their eyes from excessive UV light and dry, jorge l conditions. The use of artificial tears in dry conditions was encouraged. Return for follow-up as scheduled.

## 2021-01-08 NOTE — PATIENT DISCUSSION
WONGUECULA, OU:  PRESCRIBE ARTIFICIAL TEARS PRN OU. DECREASE OUTDOOR EXPOSURE AND USE UV PROTECTION/ SUNGLASSES WITH OUTDOOR ACTIVITIES. RETURN FOR FOLLOW UP AS SCHEDULED.

## 2021-01-27 NOTE — PATIENT DISCUSSION
New Prescription: Prolensa (bromfenac): drops: 0.07% 1 drop every morning as directed into right eye 01-

## 2021-01-27 NOTE — PATIENT DISCUSSION
New Prescription: prednisolone acetate (prednisolone acetate): drops,suspension: 1% 1 drop three times a day as directed into right eye 01-

## 2021-01-27 NOTE — PATIENT DISCUSSION
New Prescription: moxifloxacin (moxifloxacin): drops: 0.5% 1 drop three times a day as directed into right eye 01-

## 2021-02-10 NOTE — PATIENT DISCUSSION
Continue: moxifloxacin (moxifloxacin): drops: 0.5% 1 drop three times a day as directed into right eye 01-

## 2021-02-10 NOTE — PATIENT DISCUSSION
S/P PC IOL, OD: DOING WELL. CONTINUE DROPS AS DIRECTED. RETURN FOR FOLLOW-UP AS SCHEDULED WITH DR. BOWLES.

## 2021-02-10 NOTE — PATIENT DISCUSSION
Continue: prednisolone acetate (prednisolone acetate): drops,suspension: 1% 1 drop three times a day as directed into right eye 01-

## 2021-02-15 NOTE — PATIENT DISCUSSION
Continue as directed in right eye. Start two days prior to surgery in left eye  and continue as directed.

## 2021-02-15 NOTE — PATIENT DISCUSSION
*S/P PC IOL, OD: DOING WELL. CONTINUE TO TAPER DROPS AS DIRECTED. OK TO PROCEED WITH THE 2ND EYE CATARACT SURGERY AS SCHEDULED.

## 2021-02-15 NOTE — PATIENT DISCUSSION
Continue: moxifloxacin (moxifloxacin): drops: 0.5% 1 drop three times a day as directed into both eyes 01-

## 2021-02-15 NOTE — PATIENT DISCUSSION
Continue: prednisolone acetate (prednisolone acetate): drops,suspension: 1% 1 drop as directed into both eyes 01-

## 2021-03-10 NOTE — PATIENT DISCUSSION
S/P PC IOL, OU: DOING WELL. CONTINUE DROPS AS DIRECTED. SPECS RX OFFERED. RX ARC IN SPECS TO MINIMIZE GLARE. OK TO USE ARTIFICIAL TEARS BID/PRN OU FOR ANY IRRITATION. RETURN FOR FOLLOW-UP AS SCHEDULED.

## 2022-03-14 NOTE — PATIENT DISCUSSION
Diagnosed today. Recommend AREDS 2 multivitamin supplements. Recommend patient see retinal specilist. Refer to Dr. Jo Valencia.

## 2022-09-06 ENCOUNTER — NEW PATIENT (OUTPATIENT)
Dept: URBAN - METROPOLITAN AREA CLINIC 1 | Facility: CLINIC | Age: 72
End: 2022-09-06

## 2022-09-06 DIAGNOSIS — H04.123: ICD-10-CM

## 2022-09-06 DIAGNOSIS — Z96.1: ICD-10-CM

## 2022-09-06 DIAGNOSIS — H26.492: ICD-10-CM

## 2022-09-06 DIAGNOSIS — H16.143: ICD-10-CM

## 2022-09-06 PROCEDURE — 99204 OFFICE O/P NEW MOD 45 MIN: CPT

## 2022-09-06 PROCEDURE — 92015 DETERMINE REFRACTIVE STATE: CPT

## 2022-09-06 ASSESSMENT — VISUAL ACUITY
OS_BAT: 20/40 +
OD_CC: 20/20-2
OS_CC: 20/20

## 2022-09-06 ASSESSMENT — TONOMETRY
OS_IOP_MMHG: 14
OD_IOP_MMHG: 14

## 2022-10-19 NOTE — PATIENT DISCUSSION
Retinal Tear and Detachment precautions reviewed and discussed with patient. Patient understands to return immediately for any concerning changes in vision. See #1.

## 2022-10-21 ENCOUNTER — CLINIC PROCEDURE ONLY (OUTPATIENT)
Dept: URBAN - METROPOLITAN AREA CLINIC 1 | Facility: CLINIC | Age: 72
End: 2022-10-21

## 2022-10-21 DIAGNOSIS — H26.492: ICD-10-CM

## 2022-10-21 DIAGNOSIS — Z96.1: ICD-10-CM

## 2022-10-21 PROCEDURE — 66821 AFTER CATARACT LASER SURGERY: CPT

## 2022-12-20 ENCOUNTER — POST-OP (OUTPATIENT)
Dept: URBAN - METROPOLITAN AREA CLINIC 1 | Facility: CLINIC | Age: 72
End: 2022-12-20

## 2022-12-20 PROCEDURE — 99024 POSTOP FOLLOW-UP VISIT: CPT

## 2022-12-20 ASSESSMENT — VISUAL ACUITY
OD_SC: J3
OS_SC: J3
OD_SC: 20/30+2
OS_SC: 20/20-1

## 2022-12-20 ASSESSMENT — TONOMETRY
OS_IOP_MMHG: 14
OD_IOP_MMHG: 14

## 2023-06-12 ENCOUNTER — EMERGENCY VISIT (OUTPATIENT)
Dept: URBAN - METROPOLITAN AREA CLINIC 1 | Facility: CLINIC | Age: 73
End: 2023-06-12

## 2023-06-12 DIAGNOSIS — H00.035: ICD-10-CM

## 2023-06-12 PROCEDURE — 92012 INTRM OPH EXAM EST PATIENT: CPT

## 2023-06-12 RX ORDER — CEPHALEXIN 500 MG/1: 1 CAPSULE ORAL TWICE A DAY

## 2023-06-12 ASSESSMENT — VISUAL ACUITY
OD_CC: 20/20-2
OU_CC: J1
OS_CC: 20/25+2

## 2023-06-12 ASSESSMENT — TONOMETRY
OD_IOP_MMHG: 14
OS_IOP_MMHG: 14

## 2023-07-06 ENCOUNTER — EMERGENCY VISIT (OUTPATIENT)
Dept: URBAN - METROPOLITAN AREA CLINIC 1 | Facility: CLINIC | Age: 73
End: 2023-07-06

## 2023-07-06 DIAGNOSIS — H01.024: ICD-10-CM

## 2023-07-06 DIAGNOSIS — H04.123: ICD-10-CM

## 2023-07-06 DIAGNOSIS — H01.021: ICD-10-CM

## 2023-07-06 DIAGNOSIS — H16.143: ICD-10-CM

## 2023-07-06 PROCEDURE — 99213 OFFICE O/P EST LOW 20 MIN: CPT

## 2023-07-06 ASSESSMENT — TONOMETRY
OD_IOP_MMHG: 14
OS_IOP_MMHG: 15

## 2023-07-06 ASSESSMENT — VISUAL ACUITY
OD_CC: 20/25-1
OS_CC: 20/25-2

## 2023-11-22 ENCOUNTER — COMPREHENSIVE EXAM (OUTPATIENT)
Dept: URBAN - METROPOLITAN AREA CLINIC 1 | Facility: CLINIC | Age: 73
End: 2023-11-22

## 2023-11-22 DIAGNOSIS — Z96.1: ICD-10-CM

## 2023-11-22 DIAGNOSIS — H04.123: ICD-10-CM

## 2023-11-22 DIAGNOSIS — H16.143: ICD-10-CM

## 2023-11-22 PROCEDURE — 92015 DETERMINE REFRACTIVE STATE: CPT

## 2023-11-22 PROCEDURE — 99214 OFFICE O/P EST MOD 30 MIN: CPT

## 2023-11-22 ASSESSMENT — VISUAL ACUITY
OD_CC: J1
OS_CC: J1
OS_CC: 20/20-1
OD_CC: 20/30+1

## 2023-11-22 ASSESSMENT — TONOMETRY
OD_IOP_MMHG: 14
OS_IOP_MMHG: 13

## 2024-02-05 NOTE — PATIENT DISCUSSION
1) Encourage frequent small amounts of fluids such as Pedialyte, Sprite, apple juice.  Give 1/2 oz every 30 minutes, increase amounts as tolerated.  2) Limit dairy products for 3-5 days.  3) [place] diet such as bananas, rice, applesauce, toast as tolerated.  4) Follow up promptly if signs of dehydration occur.  Follow up if not improving in 2-3 days of if other concerns.    Lens Material:

## 2024-03-01 ENCOUNTER — EMERGENCY VISIT (OUTPATIENT)
Dept: URBAN - METROPOLITAN AREA CLINIC 2 | Facility: CLINIC | Age: 74
End: 2024-03-01

## 2024-03-01 DIAGNOSIS — H18.831: ICD-10-CM

## 2024-03-01 DIAGNOSIS — H16.223: ICD-10-CM

## 2024-03-01 PROCEDURE — 99213 OFFICE O/P EST LOW 20 MIN: CPT

## 2024-03-01 ASSESSMENT — VISUAL ACUITY
OS_CC: 20/30
OS_CC: 20/20
OD_CC: 20/30
OU_CC: 20/20
OU_CC: 20/30
OD_CC: 20/30

## 2024-03-01 ASSESSMENT — TONOMETRY
OS_IOP_MMHG: 14
OD_IOP_MMHG: 15

## 2024-04-03 ENCOUNTER — FOLLOW UP (OUTPATIENT)
Dept: URBAN - METROPOLITAN AREA CLINIC 2 | Facility: CLINIC | Age: 74
End: 2024-04-03

## 2024-04-03 DIAGNOSIS — H18.831: ICD-10-CM

## 2024-04-03 DIAGNOSIS — H16.8: ICD-10-CM

## 2024-04-03 DIAGNOSIS — H16.223: ICD-10-CM

## 2024-04-03 PROCEDURE — 99213 OFFICE O/P EST LOW 20 MIN: CPT

## 2024-04-03 ASSESSMENT — TONOMETRY
OD_IOP_MMHG: 12
OS_IOP_MMHG: 12

## 2024-04-03 ASSESSMENT — VISUAL ACUITY
OS_CC: 20/20
OD_CC: 20/25

## 2024-12-02 ENCOUNTER — COMPREHENSIVE EXAM (OUTPATIENT)
Age: 74
End: 2024-12-02

## 2024-12-02 DIAGNOSIS — H16.143: ICD-10-CM

## 2024-12-02 DIAGNOSIS — H04.123: ICD-10-CM

## 2024-12-02 DIAGNOSIS — Z96.1: ICD-10-CM

## 2024-12-02 PROCEDURE — 92015 DETERMINE REFRACTIVE STATE: CPT

## 2024-12-02 PROCEDURE — 99214 OFFICE O/P EST MOD 30 MIN: CPT
